# Patient Record
Sex: MALE | Race: WHITE | NOT HISPANIC OR LATINO | Employment: FULL TIME | URBAN - METROPOLITAN AREA
[De-identification: names, ages, dates, MRNs, and addresses within clinical notes are randomized per-mention and may not be internally consistent; named-entity substitution may affect disease eponyms.]

---

## 2018-11-03 ENCOUNTER — HOSPITAL ENCOUNTER (OUTPATIENT)
Dept: RADIOLOGY | Facility: MEDICAL CENTER | Age: 33
End: 2018-11-03
Attending: FAMILY MEDICINE
Payer: COMMERCIAL

## 2018-11-03 DIAGNOSIS — M54.40 ACUTE RIGHT-SIDED LOW BACK PAIN WITH SCIATICA, SCIATICA LATERALITY UNSPECIFIED: ICD-10-CM

## 2018-11-03 PROCEDURE — 72110 X-RAY EXAM L-2 SPINE 4/>VWS: CPT

## 2020-09-30 ENCOUNTER — TELEPHONE (OUTPATIENT)
Dept: SCHEDULING | Facility: IMAGING CENTER | Age: 35
End: 2020-09-30

## 2020-10-07 ENCOUNTER — OFFICE VISIT (OUTPATIENT)
Dept: MEDICAL GROUP | Facility: MEDICAL CENTER | Age: 35
End: 2020-10-07
Payer: COMMERCIAL

## 2020-10-07 VITALS
RESPIRATION RATE: 16 BRPM | HEIGHT: 69 IN | SYSTOLIC BLOOD PRESSURE: 126 MMHG | OXYGEN SATURATION: 98 % | DIASTOLIC BLOOD PRESSURE: 72 MMHG | HEART RATE: 78 BPM | WEIGHT: 178 LBS | BODY MASS INDEX: 26.36 KG/M2 | TEMPERATURE: 97.2 F

## 2020-10-07 DIAGNOSIS — F41.1 GAD (GENERALIZED ANXIETY DISORDER): ICD-10-CM

## 2020-10-07 DIAGNOSIS — Z23 INFLUENZA VACCINE NEEDED: ICD-10-CM

## 2020-10-07 DIAGNOSIS — C80.1 ADENOID CYSTIC CARCINOMA (HCC): ICD-10-CM

## 2020-10-07 PROCEDURE — 90471 IMMUNIZATION ADMIN: CPT | Performed by: NURSE PRACTITIONER

## 2020-10-07 PROCEDURE — 99203 OFFICE O/P NEW LOW 30 MIN: CPT | Mod: 25 | Performed by: NURSE PRACTITIONER

## 2020-10-07 PROCEDURE — 90686 IIV4 VACC NO PRSV 0.5 ML IM: CPT | Performed by: NURSE PRACTITIONER

## 2020-10-07 RX ORDER — IBUPROFEN 200 MG
200 TABLET ORAL EVERY 6 HOURS PRN
COMMUNITY
End: 2020-10-07

## 2020-10-07 ASSESSMENT — ENCOUNTER SYMPTOMS: NERVOUS/ANXIOUS: 1

## 2020-10-07 ASSESSMENT — PATIENT HEALTH QUESTIONNAIRE - PHQ9: CLINICAL INTERPRETATION OF PHQ2 SCORE: 0

## 2020-10-07 NOTE — PROGRESS NOTES
Subjective:      Yousif Hinton is a 35 y.o. male who presents with Establish Care and Anxiety        CC: Patient here today accompanied by his wife who is also establishing care today.  He is here mainly for concerns about anxiety.    HPI       1. HOLLY (generalized anxiety disorder)  Patient reports that he has been having increased problems with anxiety over the last half year.  It is mostly due to his employer being bought out by another company but he also has a child who is being checked for neurofibromatosis.  He states there is possibility of depression as well but is mostly anxiety.  He has never been treated for this in the past.  He is not suicidal or homicidal.  He has never been to counseling.  He denies drug or alcohol abuse or excessive use of stimulants.    2. Adenoid cystic carcinoma (HCC)  Patient reports that he was following with Dr. 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  He states this was removed and he does get regular ultrasounds but has not followed with ENT in a few years because of insurance changes.  He has been following with [facility] PCP until his insurance changed recently.    3. Influenza vaccine needed  Patient due for vaccine  No past medical history on file.  Social History     Socioeconomic History   • Marital status: Single     Spouse name: Not on file   • Number of children: Not on file   • Years of education: Not on file   • Highest education level: Not on file   Occupational History   • Not on file   Social Needs   • Financial resource strain: Not on file   • Food insecurity     Worry: Not on file     Inability: Not on file   • Transportation needs     Medical: Not on file     Non-medical: Not on file   Tobacco Use   • Smoking status: Never Smoker   • Smokeless tobacco: Never Used   Substance and Sexual Activity   • Alcohol use: Yes      "Alcohol/week: 0.0 oz     Comment: occ   • Drug use: Never   • Sexual activity: Yes     Partners: Female   Lifestyle   • Physical activity     Days per week: Not on file     Minutes per session: Not on file   • Stress: Not on file   Relationships   • Social connections     Talks on phone: Not on file     Gets together: Not on file     Attends Bahai service: Not on file     Active member of club or organization: Not on file     Attends meetings of clubs or organizations: Not on file     Relationship status: Not on file   • Intimate partner violence     Fear of current or ex partner: Not on file     Emotionally abused: Not on file     Physically abused: Not on file     Forced sexual activity: Not on file   Other Topics Concern   • Not on file   Social History Narrative   • Not on file     Current Outpatient Medications   Medication Sig Dispense Refill   • sertraline (ZOLOFT) 50 MG Tab Take 1 Tab by mouth every day. 90 Tab 3     No current facility-administered medications for this visit.      Family History   Problem Relation Age of Onset   • No Known Problems Mother    • No Known Problems Father          Review of Systems   Psychiatric/Behavioral: The patient is nervous/anxious.    All other systems reviewed and are negative.         Objective:     /72 (BP Location: Left arm, Patient Position: Sitting, BP Cuff Size: Adult)   Pulse 78   Temp 36.2 °C (97.2 °F) (Temporal)   Resp 16   Ht 1.753 m (5' 9\")   Wt 80.7 kg (178 lb)   SpO2 98%   BMI 26.29 kg/m²      Physical Exam  Vitals signs and nursing note reviewed.   Constitutional:       General: He is not in acute distress.     Appearance: He is well-developed. He is not diaphoretic.   HENT:      Head: Normocephalic and atraumatic.      Right Ear: External ear normal.      Left Ear: External ear normal.      Nose: Nose normal.   Eyes:      General:         Right eye: No discharge.         Left eye: No discharge.      Conjunctiva/sclera: Conjunctivae " normal.   Neck:      Musculoskeletal: Normal range of motion and neck supple.      Thyroid: No thyromegaly.      Trachea: No tracheal deviation.   Cardiovascular:      Rate and Rhythm: Normal rate and regular rhythm.      Heart sounds: Normal heart sounds. No murmur.   Pulmonary:      Effort: Pulmonary effort is normal. No respiratory distress.      Breath sounds: Normal breath sounds. No wheezing or rales.   Lymphadenopathy:      Cervical: No cervical adenopathy.   Skin:     General: Skin is warm and dry.      Findings: No erythema or rash.   Neurological:      Mental Status: He is alert and oriented to person, place, and time.      Coordination: Coordination normal.   Psychiatric:         Mood and Affect: Mood is anxious.         Behavior: Behavior normal.         Thought Content: Thought content normal.         Judgment: Judgment normal.      Comments: Does not appear in any acute distress.                 Assessment/Plan:        1. HOLLY (generalized anxiety disorder)  I discussed options with patient including counseling and medication.  He would be willing to try counseling and a referral placed although I did explain it may take 2 months or more to get into counseling.  He would like to try medication and I reviewed the pros and cons of SSRI medication.  He will start on Zoloft 50 mg daily with a half a tablet daily for the first week.    Depression teaching included:    Discussed in depth with patient the pro's and con's of antidepressant therapy. I explained that it may take 2-4 wekks for the medication to take effect. Side effects discussed. Some people can have increased depression on these medications. If you should develope any homicidal or suicidal thoughts, you need to go to the emergency room immediatly for evaluation and possible admission. Otherwise I would like to see you back in two weeks to evaluate treatment success.    You should also start or continue counseling while on medication because  antidepressents are a adjunct to treatment, not a substitute.  - REFERRAL TO PSYCHOLOGY  - sertraline (ZOLOFT) 50 MG Tab; Take 1 Tab by mouth every day.  Dispense: 90 Tab; Refill: 3    2. Adenoid cystic carcinoma (HCC)  Advised patient that since he has not been to an ENT in a few years that I would place a referral for him for follow-up on this.  It is unclear how often he is supposed to follow-up for imaging although it appears it is yearly.  - REFERRAL TO ENT    3. Influenza vaccine needed  I have placed the below orders and discussed them with an approved delegating provider. The MA is performing the below orders under the direction of Dr. Juarez    - Influenza Vaccine Quad Injection (PF)

## 2022-08-31 ENCOUNTER — APPOINTMENT (RX ONLY)
Dept: URBAN - METROPOLITAN AREA CLINIC 6 | Facility: CLINIC | Age: 37
Setting detail: DERMATOLOGY
End: 2022-08-31

## 2022-08-31 DIAGNOSIS — L81.4 OTHER MELANIN HYPERPIGMENTATION: ICD-10-CM

## 2022-08-31 DIAGNOSIS — L82.1 OTHER SEBORRHEIC KERATOSIS: ICD-10-CM

## 2022-08-31 DIAGNOSIS — Z71.89 OTHER SPECIFIED COUNSELING: ICD-10-CM

## 2022-08-31 DIAGNOSIS — D22 MELANOCYTIC NEVI: ICD-10-CM

## 2022-08-31 PROBLEM — D22.5 MELANOCYTIC NEVI OF TRUNK: Status: ACTIVE | Noted: 2022-08-31

## 2022-08-31 PROBLEM — D22.62 MELANOCYTIC NEVI OF LEFT UPPER LIMB, INCLUDING SHOULDER: Status: ACTIVE | Noted: 2022-08-31

## 2022-08-31 PROBLEM — D22.61 MELANOCYTIC NEVI OF RIGHT UPPER LIMB, INCLUDING SHOULDER: Status: ACTIVE | Noted: 2022-08-31

## 2022-08-31 PROCEDURE — 99203 OFFICE O/P NEW LOW 30 MIN: CPT

## 2022-08-31 PROCEDURE — ? SUNSCREEN RECOMMENDATIONS

## 2022-08-31 PROCEDURE — ? PHOTO-DOCUMENTATION

## 2022-08-31 PROCEDURE — ? COUNSELING

## 2022-08-31 ASSESSMENT — LOCATION DETAILED DESCRIPTION DERM
LOCATION DETAILED: LEFT DISTAL POSTERIOR UPPER ARM
LOCATION DETAILED: LEFT POSTERIOR SHOULDER
LOCATION DETAILED: RIGHT INFERIOR MEDIAL MIDBACK
LOCATION DETAILED: INFERIOR THORACIC SPINE
LOCATION DETAILED: RIGHT DISTAL POSTERIOR UPPER ARM
LOCATION DETAILED: LEFT PROXIMAL PRETIBIAL REGION
LOCATION DETAILED: RIGHT POSTERIOR SHOULDER

## 2022-08-31 ASSESSMENT — LOCATION ZONE DERM
LOCATION ZONE: ARM
LOCATION ZONE: LEG
LOCATION ZONE: TRUNK

## 2022-08-31 ASSESSMENT — LOCATION SIMPLE DESCRIPTION DERM
LOCATION SIMPLE: RIGHT UPPER ARM
LOCATION SIMPLE: RIGHT LOWER BACK
LOCATION SIMPLE: LEFT UPPER ARM
LOCATION SIMPLE: LEFT SHOULDER
LOCATION SIMPLE: RIGHT SHOULDER
LOCATION SIMPLE: UPPER BACK
LOCATION SIMPLE: LEFT PRETIBIAL REGION

## 2022-08-31 NOTE — PROCEDURE: PHOTO-DOCUMENTATION
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 \\nCaptured clinical photos of the upper/lower back due to highest density of nevi.
Detail Level: Detailed
Photo Preface (Leave Blank If You Do Not Want): Photographs were obtained today

## 2022-08-31 NOTE — PROCEDURE: COUNSELING
Detail Level: Zone
Detail Level: Simple
Sunscreen Recommendations: Recommended broad spectrum inorganic or physical sunscreens primarily containing zinc oxide or titanium dioxide.

## 2022-09-08 ENCOUNTER — OFFICE VISIT (OUTPATIENT)
Dept: URGENT CARE | Facility: PHYSICIAN GROUP | Age: 37
End: 2022-09-08
Payer: COMMERCIAL

## 2022-09-08 VITALS
TEMPERATURE: 98 F | WEIGHT: 182 LBS | BODY MASS INDEX: 28.56 KG/M2 | HEART RATE: 76 BPM | DIASTOLIC BLOOD PRESSURE: 76 MMHG | OXYGEN SATURATION: 100 % | SYSTOLIC BLOOD PRESSURE: 120 MMHG | HEIGHT: 67 IN | RESPIRATION RATE: 14 BRPM

## 2022-09-08 DIAGNOSIS — J06.9 VIRAL URI WITH COUGH: ICD-10-CM

## 2022-09-08 LAB
INT CON NEG: NEGATIVE
INT CON POS: POSITIVE
S PYO AG THROAT QL: NEGATIVE

## 2022-09-08 PROCEDURE — 87880 STREP A ASSAY W/OPTIC: CPT | Performed by: PHYSICIAN ASSISTANT

## 2022-09-08 PROCEDURE — 99213 OFFICE O/P EST LOW 20 MIN: CPT | Performed by: PHYSICIAN ASSISTANT

## 2022-09-08 ASSESSMENT — ENCOUNTER SYMPTOMS
SINUS PAIN: 0
DIAPHORESIS: 0
ABDOMINAL PAIN: 0
WHEEZING: 0
SHORTNESS OF BREATH: 0
MYALGIAS: 1
COUGH: 1
DIARRHEA: 0
FEVER: 1
VOMITING: 0
DIZZINESS: 0
CHILLS: 0
NAUSEA: 0
PALPITATIONS: 0
SPUTUM PRODUCTION: 0
HEADACHES: 0
SORE THROAT: 1

## 2022-09-08 NOTE — PROGRESS NOTES
Subjective:     CHIEF COMPLAINT  Chief Complaint   Patient presents with    Sore Throat     Runny nose, cough, daughter has strep, x 1 week        HPI  Yousif Hinton is a very pleasant 36 y.o. male who presents to the clinic with URI-like symptoms x1 week.  Patient states symptoms started with generalized body aches, chills and fatigue.  Believes he was running a low-grade fever.  He then developed sinus congestion, cough and sore throat.  Sore throat is worse in the morning.  This gradually improves throughout the day.  No difficulty swallowing or handling secretions.  Cough is intermittent, dry and nonproductive.  No shortness of breath or chest pain.  He has been taking [place] cold and flu with minimal relief.  States his daughter tested positive for strep pharyngitis last week.  He took 2 at home COVID tests earlier this week that returned negative.    REVIEW OF SYSTEMS  Review of Systems   Constitutional:  Positive for fever and malaise/fatigue. Negative for chills and diaphoresis.   HENT:  Positive for congestion and sore throat. Negative for ear pain and sinus pain.    Respiratory:  Positive for cough. Negative for sputum production, shortness of breath and wheezing.    Cardiovascular:  Negative for chest pain and palpitations.   Gastrointestinal:  Negative for abdominal pain, diarrhea, nausea and vomiting.   Musculoskeletal:  Positive for myalgias.   Neurological:  Negative for dizziness and headaches.     PAST MEDICAL HISTORY  Patient Active Problem List    Diagnosis Date Noted    HOLLY (generalized anxiety disorder) 10/07/2020    Adenoid cystic carcinoma (HCC) 10/07/2020       SURGICAL HISTORY  patient denies any surgical history    ALLERGIES  Allergies   Allergen Reactions    Amoxicillin Hives       CURRENT MEDICATIONS  Home Medications       Reviewed by Daniele Corcoran P.A.-C. (Physician Assistant) on 09/08/22 at 1519  Med List Status: <None>     Medication Last Dose Status   sertraline (ZOLOFT)  "50 MG Tab Not Taking Active                    SOCIAL HISTORY  Social History     Tobacco Use    Smoking status: Never    Smokeless tobacco: Never   Vaping Use    Vaping Use: Never used   Substance and Sexual Activity    Alcohol use: Yes     Alcohol/week: 0.0 oz     Comment: occ    Drug use: Never    Sexual activity: Yes     Partners: Female       FAMILY HISTORY  Family History   Problem Relation Age of Onset    No Known Problems Mother     No Known Problems Father           Objective:     VITAL SIGNS: /76 (BP Location: Right arm, Patient Position: Sitting, BP Cuff Size: Adult)   Pulse 76   Temp 36.7 °C (98 °F) (Temporal)   Resp 14   Ht 1.702 m (5' 7\")   Wt 82.6 kg (182 lb)   SpO2 100%   BMI 28.51 kg/m²     PHYSICAL EXAM  Physical Exam  Constitutional:       General: He is not in acute distress.     Appearance: Normal appearance. He is not ill-appearing, toxic-appearing or diaphoretic.   HENT:      Head: Normocephalic and atraumatic.      Right Ear: Tympanic membrane, ear canal and external ear normal.      Left Ear: Tympanic membrane, ear canal and external ear normal.      Nose: Congestion and rhinorrhea present.      Mouth/Throat:      Mouth: Mucous membranes are moist.      Pharynx: No oropharyngeal exudate or posterior oropharyngeal erythema.      Comments: PND  Eyes:      Conjunctiva/sclera: Conjunctivae normal.   Cardiovascular:      Rate and Rhythm: Normal rate and regular rhythm.      Pulses: Normal pulses.      Heart sounds: Normal heart sounds.   Pulmonary:      Effort: Pulmonary effort is normal.      Breath sounds: Normal breath sounds. No wheezing, rhonchi or rales.   Musculoskeletal:      Cervical back: Normal range of motion. No muscular tenderness.   Lymphadenopathy:      Cervical: No cervical adenopathy.   Skin:     General: Skin is warm and dry.      Capillary Refill: Capillary refill takes less than 2 seconds.   Neurological:      Mental Status: He is alert.   Psychiatric:         " Mood and Affect: Mood normal.         Thought Content: Thought content normal.     POCT Strep: Negative     Assessment/Plan:     1. Viral URI with cough    - POCT Rapid Strep A    MDM/Comments:    -Discussed viral etiology of URI.     Symptomatic care.  -Oral hydration and rest.   -Over the counter expectorant as directed; Guaifenesin (Mucinex).  -Diphenhydramine as directed for rhinorrhea (runny nose) and sneezing.  --May take over the counter pseudoephedrine for nasal congestion. Can increase your blood pressure.  -Tylenol or ibuprofen for pain and fever as directed.   -Saline nasal spray as a decongestant.    Follow up for shortness of breath, fevers, elevated heart rate, weakness, prolonged cough, chest pain, or any other concerns.      Differential diagnosis, natural history, supportive care, and indications for immediate follow-up discussed. All questions answered. Patient agrees with the plan of care.    Follow-up as needed if symptoms worsen or fail to improve to PCP, Urgent care or Emergency Room.    I have personally reviewed prior external notes and test results pertinent to today's visit.  I have independently reviewed and interpreted all diagnostics ordered during this urgent care acute visit.   Discussed management options (risks,benefits, and alternatives to treatment). Pt expresses understanding and the treatment plan was agreed upon. Questions were encouraged and answered to pt's satisfaction.    Please note that this dictation was created using voice recognition software. I have made a reasonable attempt to correct obvious errors, but I expect that there are errors of grammar and possibly content that I did not discover before finalizing the note.

## 2022-11-30 ENCOUNTER — APPOINTMENT (RX ONLY)
Dept: URBAN - METROPOLITAN AREA CLINIC 6 | Facility: CLINIC | Age: 37
Setting detail: DERMATOLOGY
End: 2022-11-30

## 2022-11-30 DIAGNOSIS — D22 MELANOCYTIC NEVI: ICD-10-CM | Status: STABLE

## 2022-11-30 PROBLEM — D22.5 MELANOCYTIC NEVI OF TRUNK: Status: ACTIVE | Noted: 2022-11-30

## 2022-11-30 PROCEDURE — ? PHOTO-DOCUMENTATION

## 2022-11-30 PROCEDURE — 99212 OFFICE O/P EST SF 10 MIN: CPT

## 2022-11-30 PROCEDURE — ? COUNSELING

## 2022-11-30 ASSESSMENT — LOCATION ZONE DERM: LOCATION ZONE: TRUNK

## 2022-11-30 ASSESSMENT — LOCATION SIMPLE DESCRIPTION DERM: LOCATION SIMPLE: UPPER BACK

## 2022-11-30 ASSESSMENT — LOCATION DETAILED DESCRIPTION DERM: LOCATION DETAILED: INFERIOR THORACIC SPINE

## 2022-11-30 NOTE — PROCEDURE: PHOTO-DOCUMENTATION
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 Will continue to monitor and recheck at f/u visit. \\nCaptured clinical photos of the upper/lower back due to highest density of nevi.\\n\\nCaptured clinical photos of additional nevus in close proximity, appears most consistent with an irritated nevus vs. traumatized/recurrent nevus, unsure of previous trauma. Will re-evaluate at f/u visit in 6 months.
Detail Level: Detailed
Photo Preface (Leave Blank If You Do Not Want): Photographs were obtained today